# Patient Record
Sex: MALE | Race: WHITE | NOT HISPANIC OR LATINO | URBAN - METROPOLITAN AREA
[De-identification: names, ages, dates, MRNs, and addresses within clinical notes are randomized per-mention and may not be internally consistent; named-entity substitution may affect disease eponyms.]

---

## 2018-11-16 ENCOUNTER — IMPORTED ENCOUNTER (OUTPATIENT)
Dept: URBAN - METROPOLITAN AREA CLINIC 50 | Facility: CLINIC | Age: 68
End: 2018-11-16

## 2018-11-21 ENCOUNTER — IMPORTED ENCOUNTER (OUTPATIENT)
Dept: URBAN - METROPOLITAN AREA CLINIC 50 | Facility: CLINIC | Age: 68
End: 2018-11-21

## 2019-05-10 ENCOUNTER — IMPORTED ENCOUNTER (OUTPATIENT)
Dept: URBAN - METROPOLITAN AREA CLINIC 50 | Facility: CLINIC | Age: 69
End: 2019-05-10

## 2019-05-13 ENCOUNTER — IMPORTED ENCOUNTER (OUTPATIENT)
Dept: URBAN - METROPOLITAN AREA CLINIC 50 | Facility: CLINIC | Age: 69
End: 2019-05-13

## 2019-11-08 ENCOUNTER — IMPORTED ENCOUNTER (OUTPATIENT)
Dept: URBAN - METROPOLITAN AREA CLINIC 50 | Facility: CLINIC | Age: 69
End: 2019-11-08

## 2019-11-12 ENCOUNTER — IMPORTED ENCOUNTER (OUTPATIENT)
Dept: URBAN - METROPOLITAN AREA CLINIC 50 | Facility: CLINIC | Age: 69
End: 2019-11-12

## 2020-05-08 ENCOUNTER — IMPORTED ENCOUNTER (OUTPATIENT)
Dept: URBAN - METROPOLITAN AREA CLINIC 50 | Facility: CLINIC | Age: 70
End: 2020-05-08

## 2021-04-17 ASSESSMENT — VISUAL ACUITY
OS_CC: J1@ 16 IN
OD_BAT: 20/40
OS_BAT: 20/50
OS_OTHER: 20/50. 20/70.
OD_CC: 20/25-
OS_CC: 20/25
OS_CC: 20/25-1
OS_OTHER: 20/20. 20/60.
OS_CC: 20/20
OS_CC: 20/25
OD_CC: J1@ 16 IN
OD_BAT: 20/25
OS_CC: J1
OD_CC: 20/20
OD_CC: 20/25-1
OD_OTHER: 20/40. 20/50.
OS_CC: J1@ 16 IN
OD_BAT: 20/50
OD_CC: J1
OD_OTHER: 20/50. 20/60.
OD_CC: 20/25
OD_OTHER: 20/25. 20/40.
OD_CC: J1@ 16 IN
OS_BAT: 20/50
OS_BAT: 20/20
OS_OTHER: 20/50. 20/70.

## 2021-04-17 ASSESSMENT — TONOMETRY
OD_IOP_MMHG: 18
OS_IOP_MMHG: 18
OS_IOP_MMHG: 20
OD_IOP_MMHG: 18
OD_IOP_MMHG: 18
OD_IOP_MMHG: 20

## 2021-05-06 ENCOUNTER — PREPPED CHART (OUTPATIENT)
Dept: URBAN - METROPOLITAN AREA CLINIC 50 | Facility: CLINIC | Age: 71
End: 2021-05-06

## 2021-05-06 ASSESSMENT — TONOMETRY
OS_IOP_MMHG: 18
OD_IOP_MMHG: 18

## 2021-05-06 ASSESSMENT — VISUAL ACUITY
OS_GLARE: 20/20
OS_GLARE: 20/60
OD_GLARE: 20/40
OD_GLARE: 20/25
OD_CC: 20/25
OS_CC: 20/25

## 2021-05-06 NOTE — PATIENT DISCUSSION
"""Cataract(s) are not visually significant for cataract surgery.  Monitor by regular examinations. ""."

## 2021-05-07 ENCOUNTER — COMPREHENSIVE EXAM (OUTPATIENT)
Dept: URBAN - METROPOLITAN AREA CLINIC 50 | Facility: CLINIC | Age: 71
End: 2021-05-07

## 2021-05-07 DIAGNOSIS — H25.13: ICD-10-CM

## 2021-05-07 DIAGNOSIS — H35.373: ICD-10-CM

## 2021-05-07 DIAGNOSIS — H43.813: ICD-10-CM

## 2021-05-07 PROCEDURE — 92015 DETERMINE REFRACTIVE STATE: CPT

## 2021-05-07 PROCEDURE — 92014 COMPRE OPH EXAM EST PT 1/>: CPT

## 2021-05-07 PROCEDURE — 92134 CPTRZ OPH DX IMG PST SGM RTA: CPT

## 2021-05-07 ASSESSMENT — VISUAL ACUITY
OS_GLARE: 20/50
OU_CC: J1
OS_CC: 20/30-2
OS_GLARE: 20/30
OD_CC: 20/30-1
OD_GLARE: 20/30
OD_GLARE: 20/50
OU_CC: 20/30-2

## 2021-05-07 ASSESSMENT — TONOMETRY
OD_IOP_MMHG: 20
OS_IOP_MMHG: 19